# Patient Record
(demographics unavailable — no encounter records)

---

## 2025-01-06 NOTE — HISTORY OF PRESENT ILLNESS
[de-identified] : JEANA NAVARRO a 56 year old female here for evaluation of her left foot. Reports she felt pain and a "crack" in her foot while stepping down on 1/4/25. WB in croc's, using an ace bandage. History of left foot fracture years ago which healed well. No formal treatment to date.  [FreeTextEntry1] : left foot

## 2025-01-06 NOTE — DISCUSSION/SUMMARY
[de-identified] : Recommend weight bearing as tolerated in cam walker.  Ice to the affected area as needed.  Elevation encouraged. Diclofenac rx given.   Patient was instructed that they can not operate an automatic vehicle while wearing a CAM boot or cast on the right lower extremity. If operating a vehicle that requires use of a clutch, patient may not drive while wearing a CAM boot or cast on the left lower extremity.  Even-up discussed.   Repeat x-ray will be performed at the next office visit.

## 2025-01-06 NOTE — PHYSICAL EXAM
[Mild] : mild swelling of lateral foot [4th] : 4th [5th] : 5th [NL (40)] : plantar flexion 40 degrees [NL 30)] : inversion 30 degrees [NL (20)] : eversion 20 degrees [5___] : inversion 5[unfilled]/5 [4___] : eversion 4[unfilled]/5 [2+] : posterior tibialis pulse: 2+ [Normal] : saphenous nerve sensation normal [] : no pain with heel compression [Left] : left foot [de-identified] : Nondisplaced fracture at 5th metatarsal base. [TWNoteComboBox7] : dorsiflexion 15 degrees

## 2025-01-22 NOTE — PHYSICAL EXAM
[Mild] : mild swelling of lateral foot [NL (40)] : plantar flexion 40 degrees [NL 30)] : inversion 30 degrees [5___] : plantar flexion 5[unfilled]/5 [2+] : posterior tibialis pulse: 2+ [Normal] : saphenous nerve sensation normal [Left] : left foot [4___] : inversion 4[unfilled]/5 [] : uses walker [de-identified] : Nondisplaced fracture at 5th metatarsal base. No change in alignment.  Mild reabsorption at fracture site. [TWNoteComboBox7] : dorsiflexion 15 degrees [de-identified] : eversion 15 degrees

## 2025-01-22 NOTE — HISTORY OF PRESENT ILLNESS
[de-identified] : Patient returns for her left 5th metatarsal fracture from 1/4/25.  She has been wb in a cam boot and using a  walker.  She reports improvement, but still has some pain. [FreeTextEntry1] : left foot

## 2025-01-22 NOTE — DISCUSSION/SUMMARY
[de-identified] : Patient is doing well. Continue wb in cam boot. Even-up recommended. Ice/nsaids prn.   Repeat x-ray will be performed at the next office visit.

## 2025-02-19 NOTE — HISTORY OF PRESENT ILLNESS
[de-identified] : Patient returns for her left 5th metatarsal fracture from 1/4/25.  She has been wb in a cam boot and is not using a cane today for balance.  She reports that her foot continues to improve.  She has been doing home ROM exercises. [FreeTextEntry1] : left foot

## 2025-02-19 NOTE — DISCUSSION/SUMMARY
[de-identified] : Patient is improving. She can now be wbat in a supportive sneaker. PT is recommended. No high impact activities. Ice/nsaids prn.   Repeat x-ray will be performed at the next office visit.

## 2025-02-19 NOTE — PHYSICAL EXAM
[Mild] : mild swelling of lateral foot [NL (40)] : plantar flexion 40 degrees [NL 30)] : inversion 30 degrees [5___] : plantar flexion 5[unfilled]/5 [4___] : eversion 4[unfilled]/5 [2+] : posterior tibialis pulse: 2+ [Normal] : saphenous nerve sensation normal [Left] : left foot [] : no pain when stressing lateral tarsal metatarsal joint [Weight -] : weightbearing [The fracture is in acceptable alignment. There is progression in healing seen] : The fracture is in acceptable alignment. There is progression in healing seen [de-identified] : Nondisplaced fracture at 5th metatarsal base. No change in alignment.  Fracture line is filling in with no bone. [TWNoteComboBox7] : dorsiflexion 10 degrees [de-identified] : eversion 15 degrees